# Patient Record
Sex: MALE | Race: WHITE | NOT HISPANIC OR LATINO | Employment: PART TIME | URBAN - METROPOLITAN AREA
[De-identification: names, ages, dates, MRNs, and addresses within clinical notes are randomized per-mention and may not be internally consistent; named-entity substitution may affect disease eponyms.]

---

## 2018-01-24 ENCOUNTER — OFFICE VISIT (OUTPATIENT)
Dept: URGENT CARE | Facility: PHYSICIAN GROUP | Age: 53
End: 2018-01-24
Payer: COMMERCIAL

## 2018-01-24 ENCOUNTER — HOSPITAL ENCOUNTER (OUTPATIENT)
Dept: RADIOLOGY | Facility: MEDICAL CENTER | Age: 53
End: 2018-01-24
Attending: FAMILY MEDICINE
Payer: COMMERCIAL

## 2018-01-24 VITALS
WEIGHT: 250 LBS | DIASTOLIC BLOOD PRESSURE: 88 MMHG | HEIGHT: 72 IN | SYSTOLIC BLOOD PRESSURE: 128 MMHG | OXYGEN SATURATION: 95 % | BODY MASS INDEX: 33.86 KG/M2 | TEMPERATURE: 97.4 F | HEART RATE: 91 BPM | RESPIRATION RATE: 16 BRPM

## 2018-01-24 DIAGNOSIS — R06.02 SHORTNESS OF BREATH: ICD-10-CM

## 2018-01-24 DIAGNOSIS — R07.9 CHEST PAIN, UNSPECIFIED TYPE: ICD-10-CM

## 2018-01-24 PROCEDURE — 99204 OFFICE O/P NEW MOD 45 MIN: CPT | Performed by: FAMILY MEDICINE

## 2018-01-24 PROCEDURE — 71046 X-RAY EXAM CHEST 2 VIEWS: CPT

## 2018-01-24 ASSESSMENT — ENCOUNTER SYMPTOMS
WEIGHT LOSS: 0
SENSORY CHANGE: 0
FEVER: 0
PALPITATIONS: 0
FOCAL WEAKNESS: 0

## 2018-01-25 NOTE — PROGRESS NOTES
Subjective:      Cipriano James is a 52 y.o. male who presents with Shortness of Breath (chest pain on and off x2 months)            6 months intermittent left sternal chest pain with associated SOB. Sharp/severe. Duration seconds to 1 min. Frequent episodes today. No radiation. Duration No clear trigger or trauma. Not exertional. No positional. No cough. No hemoptysis. No limb swelling. Has not tried any treatment. No association with eating. No abdominal. No other aggravating or alleviating factors.          Review of Systems   Constitutional: Negative for fever, malaise/fatigue and weight loss.   HENT: Negative for sore throat.    Eyes: Negative for redness.   Respiratory: Negative for wheezing and stridor.    Cardiovascular: Negative for palpitations, leg swelling and PND.   Gastrointestinal: Negative for nausea.   Genitourinary: Negative for flank pain and hematuria.   Musculoskeletal: Negative for back pain, myalgias and neck pain.   Skin: Negative for itching and rash.   Neurological: Negative for sensory change and focal weakness.   Psychiatric/Behavioral: Negative for substance abuse. The patient is not nervous/anxious.      .  Medications, Allergies, and current problem list reviewed today in Epic  History reviewed. No pertinent past medical history.  FH: Brother HTN, mom enlarged heart  SH: Never smoker     Objective:     /88   Pulse 91   Temp 36.3 °C (97.4 °F)   Resp 16   Ht 1.829 m (6')   Wt 113.4 kg (250 lb)   SpO2 95%   BMI 33.91 kg/m²      Physical Exam   Constitutional: He appears well-developed and well-nourished. No distress.   HENT:   Head: Normocephalic and atraumatic.   Eyes: Conjunctivae are normal.   Neck: Neck supple. No JVD present.   Cardiovascular: Normal rate, regular rhythm and normal heart sounds.    No murmur heard.  Pulmonary/Chest: Effort normal and breath sounds normal. No respiratory distress.   Abdominal: Soft. Bowel sounds are normal. There is tenderness (mild  RUQ).   Neurological:   Speech is clear. Patient is appropriate and cooperative.                 Assessment/Plan:   CXR: no acute cardiopulmonary process by my read, radiology notes unremarkable.  EKG: NSR rate:74, normal axis, normal intervals, no evidence of ischemia or hypertrophy.      1. Chest pain, unspecified type  DX-CHEST-2 VIEWS    EKG    EKG   2. Shortness of breath  DX-CHEST-2 VIEWS    EKG    EKG     Differential diagnosis, natural history, supportive care, and indications for immediate follow-up discussed at length.   Unclear etiology. Patient understands that we have not ruled out ACS. Currently he is comfortable and without pain. Low probability ACS given clinical presentation. He will go to ER with recurrence.

## 2018-01-31 ASSESSMENT — ENCOUNTER SYMPTOMS
NAUSEA: 0
PND: 0
SORE THROAT: 0
EYE REDNESS: 0
WHEEZING: 0
FLANK PAIN: 0
NERVOUS/ANXIOUS: 0
NECK PAIN: 0
STRIDOR: 0
BACK PAIN: 0
MYALGIAS: 0

## 2018-01-31 ASSESSMENT — LIFESTYLE VARIABLES: SUBSTANCE_ABUSE: 0

## 2019-01-04 ENCOUNTER — OFFICE VISIT (OUTPATIENT)
Dept: URGENT CARE | Facility: PHYSICIAN GROUP | Age: 54
End: 2019-01-04
Payer: COMMERCIAL

## 2019-01-04 VITALS
OXYGEN SATURATION: 96 % | SYSTOLIC BLOOD PRESSURE: 126 MMHG | HEART RATE: 83 BPM | RESPIRATION RATE: 16 BRPM | BODY MASS INDEX: 33.91 KG/M2 | WEIGHT: 250 LBS | TEMPERATURE: 99.5 F | DIASTOLIC BLOOD PRESSURE: 86 MMHG

## 2019-01-04 DIAGNOSIS — K04.7 TOOTH ABSCESS: ICD-10-CM

## 2019-01-04 PROCEDURE — 99213 OFFICE O/P EST LOW 20 MIN: CPT | Performed by: FAMILY MEDICINE

## 2019-01-04 RX ORDER — SULFAMETHOXAZOLE AND TRIMETHOPRIM 800; 160 MG/1; MG/1
1 TABLET ORAL 2 TIMES DAILY
Qty: 20 TAB | Refills: 0 | Status: SHIPPED | OUTPATIENT
Start: 2019-01-04 | End: 2019-01-14

## 2019-01-04 ASSESSMENT — ENCOUNTER SYMPTOMS
CHILLS: 0
FEVER: 0
HEADACHES: 0
SHORTNESS OF BREATH: 0
SORE THROAT: 0

## 2019-01-05 NOTE — PROGRESS NOTES
Subjective:   Cipriano James is a 53 y.o. male who presents for Oral Swelling (possible infected tooth,upper right started lastnight )       Patient presents today with tooth pain and swelling starting yesterday. He states that the pain was originally just in his tooth and has now moved into the right side of his face. The pain is intermittent and about 4/10 when present. He states that today he noticed a swelling pocket in the gums above the painful tooth. He denies fever, chills, drainage, bleeding. He has made appointment with dentist for next week, but was told to come in and get antibiotics first.       Review of Systems   Constitutional: Negative for chills and fever.   HENT: Negative for sore throat.         Positive for tooth pain and swelling   Respiratory: Negative for shortness of breath.    Cardiovascular: Negative for chest pain.   Neurological: Negative for headaches.       PMH:  has no past medical history of Asthma or Diabetes ([facility]).    MEDS:   Current Outpatient Prescriptions:   •  sulfamethoxazole-trimethoprim (BACTRIM DS) 800-160 MG tablet, Take 1 Tab by mouth 2 times a day for 10 days., Disp: 20 Tab, Rfl: 0    ALLERGIES: No Known Allergies    SURGHX: No past surgical history on file.    SOCHX:  reports that he has never smoked. He has never used smokeless tobacco. He reports that he drinks alcohol. He reports that he does not use drugs.    FH: Reviewed with patient, not pertinent to this visit.     Objective:   /86 (BP Location: Left arm, Patient Position: Sitting, BP Cuff Size: Large adult)   Pulse 83   Temp 37.5 °C (99.5 °F)   Resp 16   Wt 113.4 kg (250 lb)   SpO2 96%   BMI 33.91 kg/m²   Physical Exam   Constitutional: He is oriented to person, place, and time. He appears well-developed and well-nourished. No distress.   HENT:   Head: Normocephalic and atraumatic.   Mouth/Throat: Uvula is midline, oropharynx is clear and moist and mucous membranes are normal. Dental abscesses  present.       Eyes: Conjunctivae and EOM are normal.   Neck: Normal range of motion. No tracheal deviation present.   Pulmonary/Chest: Effort normal. No respiratory distress.   Musculoskeletal:   ROM normal all four extremities   Neurological: He is alert and oriented to person, place, and time.   Skin: Skin is warm and dry.   Psychiatric: He has a normal mood and affect. His behavior is normal. Judgment and thought content normal.       Assessment/Plan:   1. Tooth abscess  - sulfamethoxazole-trimethoprim (BACTRIM DS) 800-160 MG tablet; Take 1 Tab by mouth 2 times a day for 10 days.  Dispense: 20 Tab; Refill: 0  - Advised to return or go to ED if s/s infection worsen  - Advised to take OTC ibuprofen/acetaminophen prn  - Follow up with dentist    Differential diagnosis, natural history, supportive care, and indications for immediate follow-up discussed.